# Patient Record
(demographics unavailable — no encounter records)

---

## 2024-12-12 NOTE — ASSESSMENT
[FreeTextEntry1] : Right  X-Ray examination of the ANKLE 3 views: no fractures, subluxations or dislocations. Right X-ray Examination of the FOOT 3 views: no fractures, subluxations or dislocations.   - The patient was advised of the diagnosis.  The natural history of the pathology was explained to the patient in layman's terms.  Several different treatment options were discussed and explained including the risks and benefits of both surgical and non-surgical treatments.  All questions and concerns were answered. - Due to risks of surgery, they will continue conservative treatment with PT, icing, and anti-inflammatory medications. - The patient was advised to apply ice (wrapped in a towel or protective covering) to the area daily (20 minutes at a time, 2-4X/day). - PT program to help with swelling and for proprioception training and peroneal strengthening to prevent recurrences. - The patient was advised to let pain guide the gradual advancement of activities.

## 2024-12-12 NOTE — IMAGING
[de-identified] :   RIGHT ANKLE and FOOT Inspection:  mod lateral swelling Palpation: lateral ligament tenderness Range of Motion: normal but with pain Strength: dec due to pain Neurovascular intact distally Gait: antalgic

## 2024-12-12 NOTE — HISTORY OF PRESENT ILLNESS
[de-identified] : 12 year old female  ([employer], volleyball) right ankle pain since 11/20/24 while at volleyball and rolled ankle The pain is located  lateral The pain is associated with  sharp Worse with activity and better at rest. Has tried tylenol, rest